# Patient Record
Sex: MALE | Race: WHITE | NOT HISPANIC OR LATINO | Employment: OTHER | URBAN - METROPOLITAN AREA
[De-identification: names, ages, dates, MRNs, and addresses within clinical notes are randomized per-mention and may not be internally consistent; named-entity substitution may affect disease eponyms.]

---

## 2023-08-02 ENCOUNTER — OFFICE VISIT (OUTPATIENT)
Dept: PRIMARY CARE | Facility: CLINIC | Age: 68
End: 2023-08-02
Payer: MEDICARE

## 2023-08-02 VITALS
WEIGHT: 171 LBS | HEIGHT: 70 IN | BODY MASS INDEX: 24.48 KG/M2 | HEART RATE: 61 BPM | TEMPERATURE: 97.5 F | DIASTOLIC BLOOD PRESSURE: 78 MMHG | SYSTOLIC BLOOD PRESSURE: 120 MMHG | OXYGEN SATURATION: 96 %

## 2023-08-02 DIAGNOSIS — I10 ESSENTIAL HYPERTENSION: ICD-10-CM

## 2023-08-02 DIAGNOSIS — H83.03 VIRAL LABYRINTHITIS OF BOTH EARS: Primary | ICD-10-CM

## 2023-08-02 PROBLEM — R94.39 ABNORMAL NUCLEAR STRESS TEST: Status: ACTIVE | Noted: 2023-08-02

## 2023-08-02 PROBLEM — M54.41 ACUTE RIGHT-SIDED LOW BACK PAIN WITH RIGHT-SIDED SCIATICA: Status: ACTIVE | Noted: 2023-08-02

## 2023-08-02 PROBLEM — E78.5 HYPERLIPIDEMIA: Status: ACTIVE | Noted: 2023-08-02

## 2023-08-02 PROBLEM — Z95.1 S/P CABG (CORONARY ARTERY BYPASS GRAFT): Status: ACTIVE | Noted: 2023-08-02

## 2023-08-02 PROBLEM — I51.7 ASYMMETRIC SEPTAL HYPERTROPHY: Status: ACTIVE | Noted: 2023-08-02

## 2023-08-02 PROBLEM — R06.09 DYSPNEA ON EXERTION: Status: ACTIVE | Noted: 2023-08-02

## 2023-08-02 PROBLEM — I65.23 ASYMPTOMATIC BILATERAL CAROTID ARTERY STENOSIS: Status: ACTIVE | Noted: 2023-08-02

## 2023-08-02 PROBLEM — I25.10 CORONARY ARTERY DISEASE: Status: ACTIVE | Noted: 2023-08-02

## 2023-08-02 PROBLEM — I42.2 ASYMMETRIC SEPTAL HYPERTROPHY (MULTI): Status: ACTIVE | Noted: 2023-08-02

## 2023-08-02 PROCEDURE — 99214 OFFICE O/P EST MOD 30 MIN: CPT | Performed by: FAMILY MEDICINE

## 2023-08-02 PROCEDURE — 3074F SYST BP LT 130 MM HG: CPT | Performed by: FAMILY MEDICINE

## 2023-08-02 PROCEDURE — 1126F AMNT PAIN NOTED NONE PRSNT: CPT | Performed by: FAMILY MEDICINE

## 2023-08-02 PROCEDURE — 3078F DIAST BP <80 MM HG: CPT | Performed by: FAMILY MEDICINE

## 2023-08-02 PROCEDURE — 1036F TOBACCO NON-USER: CPT | Performed by: FAMILY MEDICINE

## 2023-08-02 PROCEDURE — 1159F MED LIST DOCD IN RCRD: CPT | Performed by: FAMILY MEDICINE

## 2023-08-02 PROCEDURE — 1160F RVW MEDS BY RX/DR IN RCRD: CPT | Performed by: FAMILY MEDICINE

## 2023-08-02 RX ORDER — METOPROLOL TARTRATE 25 MG/1
1 TABLET, FILM COATED ORAL 2 TIMES DAILY
COMMUNITY
Start: 2022-04-29 | End: 2024-05-07 | Stop reason: SDUPTHER

## 2023-08-02 RX ORDER — NAPROXEN SODIUM 220 MG/1
1 TABLET, FILM COATED ORAL DAILY
COMMUNITY
Start: 2022-05-10

## 2023-08-02 RX ORDER — ATORVASTATIN CALCIUM 80 MG/1
1 TABLET, FILM COATED ORAL DAILY
COMMUNITY
Start: 2022-05-10 | End: 2024-05-07 | Stop reason: SDUPTHER

## 2023-08-02 RX ORDER — LOSARTAN POTASSIUM 50 MG/1
1 TABLET ORAL DAILY
COMMUNITY
Start: 2023-05-23 | End: 2023-10-30 | Stop reason: SDUPTHER

## 2023-08-02 ASSESSMENT — PATIENT HEALTH QUESTIONNAIRE - PHQ9
1. LITTLE INTEREST OR PLEASURE IN DOING THINGS: NOT AT ALL
SUM OF ALL RESPONSES TO PHQ9 QUESTIONS 1 & 2: 0
2. FEELING DOWN, DEPRESSED OR HOPELESS: NOT AT ALL

## 2023-08-02 ASSESSMENT — LIFESTYLE VARIABLES
HOW OFTEN DO YOU HAVE SIX OR MORE DRINKS ON ONE OCCASION: NEVER
HOW OFTEN DO YOU HAVE A DRINK CONTAINING ALCOHOL: NEVER

## 2023-08-02 ASSESSMENT — ENCOUNTER SYMPTOMS
DIZZINESS: 1
FEVER: 1
SPEECH DIFFICULTY: 0
NUMBNESS: 0
PALPITATIONS: 0
VOMITING: 0
SINUS PRESSURE: 0
SINUS PAIN: 0
HEADACHES: 0
PSYCHIATRIC NEGATIVE: 1
NAUSEA: 0
FATIGUE: 1
COUGH: 0

## 2023-08-02 ASSESSMENT — PAIN SCALES - GENERAL: PAINLEVEL: 0-NO PAIN

## 2023-08-02 NOTE — PROGRESS NOTES
Subjective   Patient ID: Aman Plaza is a 67 y.o. male who presents for Sinusitis (Patient is here for sinus congestion x 1 week).  HPI  67-year-old male with a history of hypertension presents for 4 to 5 days of intermittent vertigo.  Slight fatigue.  Home blood pressures have been stable.  His dose of losartan was recently cut in half by cardiology.  He denies any visual changes.  No headache.  No nausea or vomiting.  No fever or chills.  Review of Systems   Constitutional:  Positive for fatigue and fever.   HENT:  Negative for hearing loss, sinus pressure and sinus pain.    Eyes:  Negative for visual disturbance.   Respiratory:  Negative for cough.    Cardiovascular:  Negative for chest pain and palpitations.   Gastrointestinal:  Negative for nausea and vomiting.   Neurological:  Positive for dizziness. Negative for syncope, speech difficulty, numbness and headaches.   Psychiatric/Behavioral: Negative.     All other systems reviewed and are negative.      Visit Vitals  /78 (BP Location: Left arm, Patient Position: Sitting, BP Cuff Size: Adult)   Pulse 61   Temp 36.4 °C (97.5 °F) (Temporal)        Objective   Physical Exam  Vitals reviewed.   Constitutional:       Appearance: Normal appearance.   HENT:      Right Ear: Tympanic membrane and ear canal normal.      Left Ear: Tympanic membrane and ear canal normal.      Nose: Nose normal. No congestion.      Mouth/Throat:      Pharynx: No oropharyngeal exudate or posterior oropharyngeal erythema.   Eyes:      Extraocular Movements: Extraocular movements intact.      Pupils: Pupils are equal, round, and reactive to light.   Neck:      Vascular: No carotid bruit.   Cardiovascular:      Rate and Rhythm: Normal rate and regular rhythm.      Heart sounds: Normal heart sounds.   Pulmonary:      Breath sounds: Normal breath sounds.   Neurological:      General: No focal deficit present.      Mental Status: He is alert and oriented to person, place, and time.    Psychiatric:         Mood and Affect: Mood normal.         Behavior: Behavior normal.         Assessment/Plan   Problem List Items Addressed This Visit       Essential hypertension    Viral labyrinthitis of both ears - Primary     #1.  Over-the-counter meclizine as needed for viral labyrinthitis.  Epley maneuvers described and information provided to patient.  He should call immediately with any other symptoms.  2.  Blood pressure stable.

## 2023-08-03 ENCOUNTER — APPOINTMENT (OUTPATIENT)
Dept: PRIMARY CARE | Facility: CLINIC | Age: 68
End: 2023-08-03
Payer: MEDICARE

## 2023-10-30 DIAGNOSIS — I10 ESSENTIAL HYPERTENSION: Primary | ICD-10-CM

## 2023-10-31 DIAGNOSIS — I10 ESSENTIAL HYPERTENSION: ICD-10-CM

## 2023-10-31 RX ORDER — LOSARTAN POTASSIUM 25 MG/1
25 TABLET ORAL DAILY
Qty: 90 TABLET | Refills: 3 | Status: SHIPPED | OUTPATIENT
Start: 2023-10-31 | End: 2023-10-31 | Stop reason: SDUPTHER

## 2023-11-02 RX ORDER — LOSARTAN POTASSIUM 25 MG/1
25 TABLET ORAL DAILY
Qty: 90 TABLET | Refills: 3 | Status: SHIPPED | OUTPATIENT
Start: 2023-11-02 | End: 2024-05-23 | Stop reason: SDUPTHER

## 2024-02-21 ENCOUNTER — NURSE TRIAGE (OUTPATIENT)
Dept: PRIMARY CARE | Facility: CLINIC | Age: 69
End: 2024-02-21
Payer: MEDICARE

## 2024-05-07 DIAGNOSIS — E78.5 HYPERLIPIDEMIA, UNSPECIFIED HYPERLIPIDEMIA TYPE: Primary | ICD-10-CM

## 2024-05-07 RX ORDER — METOPROLOL TARTRATE 25 MG/1
25 TABLET, FILM COATED ORAL 2 TIMES DAILY
Qty: 90 TABLET | Refills: 3 | Status: SHIPPED | OUTPATIENT
Start: 2024-05-07 | End: 2024-05-15 | Stop reason: SDUPTHER

## 2024-05-07 RX ORDER — ATORVASTATIN CALCIUM 80 MG/1
80 TABLET, FILM COATED ORAL DAILY
Qty: 90 TABLET | Refills: 3 | Status: SHIPPED | OUTPATIENT
Start: 2024-05-07 | End: 2024-05-16 | Stop reason: SDUPTHER

## 2024-05-15 DIAGNOSIS — E78.5 HYPERLIPIDEMIA, UNSPECIFIED HYPERLIPIDEMIA TYPE: Primary | ICD-10-CM

## 2024-05-17 RX ORDER — METOPROLOL TARTRATE 25 MG/1
25 TABLET, FILM COATED ORAL 2 TIMES DAILY
Qty: 90 TABLET | Refills: 3 | Status: SHIPPED | OUTPATIENT
Start: 2024-05-17 | End: 2024-05-23 | Stop reason: SDUPTHER

## 2024-05-17 RX ORDER — ATORVASTATIN CALCIUM 80 MG/1
80 TABLET, FILM COATED ORAL DAILY
Qty: 90 TABLET | Refills: 3 | Status: SHIPPED | OUTPATIENT
Start: 2024-05-17 | End: 2024-05-23 | Stop reason: SDUPTHER

## 2024-05-23 ENCOUNTER — OFFICE VISIT (OUTPATIENT)
Dept: CARDIOLOGY | Facility: CLINIC | Age: 69
End: 2024-05-23
Payer: MEDICARE

## 2024-05-23 VITALS
BODY MASS INDEX: 24.6 KG/M2 | WEIGHT: 171.8 LBS | DIASTOLIC BLOOD PRESSURE: 80 MMHG | OXYGEN SATURATION: 94 % | HEIGHT: 70 IN | SYSTOLIC BLOOD PRESSURE: 130 MMHG | HEART RATE: 63 BPM

## 2024-05-23 DIAGNOSIS — I42.2 ASYMMETRIC SEPTAL HYPERTROPHY (MULTI): ICD-10-CM

## 2024-05-23 DIAGNOSIS — I65.23 ASYMPTOMATIC BILATERAL CAROTID ARTERY STENOSIS: ICD-10-CM

## 2024-05-23 DIAGNOSIS — R06.09 DYSPNEA ON EXERTION: ICD-10-CM

## 2024-05-23 DIAGNOSIS — I10 ESSENTIAL HYPERTENSION: Primary | ICD-10-CM

## 2024-05-23 DIAGNOSIS — Z95.1 S/P CABG (CORONARY ARTERY BYPASS GRAFT): ICD-10-CM

## 2024-05-23 DIAGNOSIS — I25.10 CORONARY ARTERY DISEASE INVOLVING NATIVE CORONARY ARTERY OF NATIVE HEART WITHOUT ANGINA PECTORIS: ICD-10-CM

## 2024-05-23 DIAGNOSIS — E78.5 HYPERLIPIDEMIA, UNSPECIFIED HYPERLIPIDEMIA TYPE: ICD-10-CM

## 2024-05-23 PROBLEM — R94.39 ABNORMAL NUCLEAR STRESS TEST: Status: RESOLVED | Noted: 2023-08-02 | Resolved: 2024-05-23

## 2024-05-23 PROCEDURE — 99213 OFFICE O/P EST LOW 20 MIN: CPT | Performed by: INTERNAL MEDICINE

## 2024-05-23 PROCEDURE — 1036F TOBACCO NON-USER: CPT | Performed by: INTERNAL MEDICINE

## 2024-05-23 PROCEDURE — 1159F MED LIST DOCD IN RCRD: CPT | Performed by: INTERNAL MEDICINE

## 2024-05-23 PROCEDURE — 3075F SYST BP GE 130 - 139MM HG: CPT | Performed by: INTERNAL MEDICINE

## 2024-05-23 PROCEDURE — 93000 ELECTROCARDIOGRAM COMPLETE: CPT | Performed by: INTERNAL MEDICINE

## 2024-05-23 PROCEDURE — 3079F DIAST BP 80-89 MM HG: CPT | Performed by: INTERNAL MEDICINE

## 2024-05-23 RX ORDER — LOSARTAN POTASSIUM 25 MG/1
25 TABLET ORAL DAILY
Qty: 90 TABLET | Refills: 3 | Status: SHIPPED | OUTPATIENT
Start: 2024-05-23

## 2024-05-23 RX ORDER — ATORVASTATIN CALCIUM 80 MG/1
80 TABLET, FILM COATED ORAL DAILY
Qty: 90 TABLET | Refills: 3 | Status: SHIPPED | OUTPATIENT
Start: 2024-05-23

## 2024-05-23 RX ORDER — METOPROLOL TARTRATE 25 MG/1
25 TABLET, FILM COATED ORAL 2 TIMES DAILY
Qty: 180 TABLET | Refills: 3 | Status: SHIPPED | OUTPATIENT
Start: 2024-05-23 | End: 2025-05-23

## 2024-05-23 ASSESSMENT — ENCOUNTER SYMPTOMS: DYSPNEA ON EXERTION: 1

## 2024-05-23 NOTE — PROGRESS NOTES
Subjective   Aman Plaza is a 68 y.o. male.    Chief Complaint:  Follow-up coronary disease, coronary artery bypass grafting.    HPI    He is now 2 years post coronary bypass grafting.  His incisional discomfort has resolved.  He has had no anginal symptoms.  No pressure or heaviness in the chest with activities.  Has had no syncopal or near syncopal events.  Mild exertional dyspnea otherwise has done well.  No other medical problems or medical illnesses over the past year.    The patient presented in 2022 with typical anginal symptoms. A stress thallium study showed 4 mm ST segment depression with exercise. The patient underwent urgent cardiac catheterization. This demonstrated total occlusion of the right coronary artery with an 80% left main stenosis. He underwent urgent coronary artery bypass grafting.      An echocardiographic study in May 2022 demonstrated asymmetric septal hypertrophy with outflow obstruction with a Valsalva maneuver.     Coronary artery bypass grafting was done on May 6, 2022. At that time he had a left internal mammary graft to the anterior descending, radial artery graft to the obtuse marginal 1 and 2. He had left atrial appendage occlusion.     His past cardiac history significant for hypertension. Has no other past cardiac history. Risk factors for coronary artery disease i are lacking. However her father  fairly young of cancer.     Past medical history: Significant for appendectomy as a teenager. He has had no hospitalizations or major surgical procedures.     Social history: He is a retired  in the [employer]. He has 4 children.     Family history: Mother had valvular heart issues and father  of cancer.     Review of Systems   Cardiovascular:  Positive for dyspnea on exertion.   All other systems reviewed and are negative.      Current Outpatient Medications   Medication Sig Dispense Refill    aspirin 81 mg chewable tablet Chew 1 tablet (81  "mg) once daily.      atorvastatin (Lipitor) 80 mg tablet Take 1 tablet (80 mg) by mouth once daily. 90 tablet 3    losartan (Cozaar) 25 mg tablet Take 1 tablet (25 mg) by mouth once daily. 90 tablet 3    metoprolol tartrate (Lopressor) 25 mg tablet Take 1 tablet (25 mg) by mouth 2 times a day. 180 tablet 3     No current facility-administered medications for this visit.        Visit Vitals  /80   Pulse 63   Ht 1.765 m (5' 9.5\")   Wt 77.9 kg (171 lb 12.8 oz)   SpO2 94%   BMI 25.01 kg/m²   Smoking Status Never   BSA 1.95 m²        Objective     Constitutional:       Appearance: Not in distress.   Neck:      Vascular: JVD normal.   Pulmonary:      Breath sounds: Normal breath sounds.   Cardiovascular:      Normal rate. Regular rhythm. S1 with normal intensity. S2 with normal intensity.       Murmurs: There is no murmur.      No gallop.    Pulses:     Intact distal pulses.   Edema:     Peripheral edema absent.   Abdominal:      General: Bowel sounds are normal.   Neurological:      Mental Status: Alert and oriented to person, place and time.         Lab Review:   Lab Results   Component Value Date     09/02/2022    K 4.4 09/02/2022     09/02/2022    CO2 28 09/02/2022    BUN 19 09/02/2022    CREATININE 0.98 09/02/2022    GLUCOSE 96 09/02/2022    CALCIUM 9.6 09/02/2022     Lab Results   Component Value Date    CHOL 121 09/02/2022    TRIG 76 09/02/2022    HDL 39.2 (A) 09/02/2022       Assessment:    1.  Coronary disease.  Status post coronary artery bypass grafting.  Has 3 arterial grafts.  No anginal symptoms.  Continue medical management.    2.  Asymmetric septal hypertrophy.  Has a soft systolic murmur.  Will repeat echo next year.  He is on low-dose beta-blocker therapy.    3.  Hypertension.  Initial blood    Today's EKG shows sinus bradycardia with heart rate of 57.  Pressure is elevated, repeat blood pressure is normal.  "

## 2025-04-29 DIAGNOSIS — E78.5 HYPERLIPIDEMIA, UNSPECIFIED HYPERLIPIDEMIA TYPE: ICD-10-CM

## 2025-04-29 RX ORDER — ATORVASTATIN CALCIUM 80 MG/1
80 TABLET, FILM COATED ORAL DAILY
Qty: 90 TABLET | Refills: 3 | Status: SHIPPED | OUTPATIENT
Start: 2025-04-29

## 2025-05-22 ENCOUNTER — HOSPITAL ENCOUNTER (OUTPATIENT)
Dept: CARDIOLOGY | Facility: CLINIC | Age: 70
Discharge: HOME | End: 2025-05-22
Payer: MEDICARE

## 2025-05-22 ENCOUNTER — APPOINTMENT (OUTPATIENT)
Dept: CARDIOLOGY | Facility: CLINIC | Age: 70
End: 2025-05-22
Payer: MEDICARE

## 2025-05-22 VITALS
BODY MASS INDEX: 24.34 KG/M2 | DIASTOLIC BLOOD PRESSURE: 76 MMHG | HEIGHT: 70 IN | HEART RATE: 58 BPM | WEIGHT: 170 LBS | OXYGEN SATURATION: 97 % | SYSTOLIC BLOOD PRESSURE: 128 MMHG

## 2025-05-22 DIAGNOSIS — E78.5 HYPERLIPIDEMIA, UNSPECIFIED HYPERLIPIDEMIA TYPE: ICD-10-CM

## 2025-05-22 DIAGNOSIS — I10 ESSENTIAL HYPERTENSION: Primary | ICD-10-CM

## 2025-05-22 DIAGNOSIS — I25.10 CORONARY ARTERY DISEASE INVOLVING NATIVE CORONARY ARTERY OF NATIVE HEART WITHOUT ANGINA PECTORIS: ICD-10-CM

## 2025-05-22 DIAGNOSIS — I51.7 ASYMMETRIC SEPTAL HYPERTROPHY: ICD-10-CM

## 2025-05-22 DIAGNOSIS — R06.09 DYSPNEA ON EXERTION: ICD-10-CM

## 2025-05-22 DIAGNOSIS — Z12.5 ENCOUNTER FOR SCREENING PROSTATE SPECIFIC ANTIGEN (PSA) MEASUREMENT: ICD-10-CM

## 2025-05-22 DIAGNOSIS — Z95.1 S/P CABG (CORONARY ARTERY BYPASS GRAFT): ICD-10-CM

## 2025-05-22 DIAGNOSIS — I65.23 ASYMPTOMATIC BILATERAL CAROTID ARTERY STENOSIS: ICD-10-CM

## 2025-05-22 LAB
AORTIC VALVE MEAN GRADIENT: 5 MMHG
AORTIC VALVE PEAK VELOCITY: 1.59 M/S
ATRIAL RATE: 51 BPM
AV PEAK GRADIENT: 10 MMHG
AVA (PEAK VEL): 2.07 CM2
AVA (VTI): 2.08 CM2
EJECTION FRACTION APICAL 4 CHAMBER: 64.9
EJECTION FRACTION: 68 %
LEFT ATRIUM VOLUME AREA LENGTH INDEX BSA: 37.3 ML/M2
LEFT VENTRICLE INTERNAL DIMENSION DIASTOLE: 4.51 CM (ref 3.5–6)
LEFT VENTRICULAR OUTFLOW TRACT DIAMETER: 2.1 CM
P AXIS: 4 DEGREES
P OFFSET: 195 MS
P ONSET: 152 MS
PR INTERVAL: 148 MS
Q ONSET: 226 MS
QRS COUNT: 8 BEATS
QRS DURATION: 88 MS
QT INTERVAL: 444 MS
QTC CALCULATION(BAZETT): 409 MS
QTC FREDERICIA: 421 MS
R AXIS: 17 DEGREES
RIGHT VENTRICLE FREE WALL PEAK S': 0.08 CM/S
RIGHT VENTRICLE PEAK SYSTOLIC PRESSURE: 42.4 MMHG
T AXIS: 64 DEGREES
T OFFSET: 448 MS
TRICUSPID ANNULAR PLANE SYSTOLIC EXCURSION: 2 CM
VENTRICULAR RATE: 51 BPM

## 2025-05-22 PROCEDURE — 93005 ELECTROCARDIOGRAM TRACING: CPT | Performed by: INTERNAL MEDICINE

## 2025-05-22 PROCEDURE — 1036F TOBACCO NON-USER: CPT | Performed by: INTERNAL MEDICINE

## 2025-05-22 PROCEDURE — 99202 OFFICE O/P NEW SF 15 MIN: CPT | Mod: 25 | Performed by: INTERNAL MEDICINE

## 2025-05-22 PROCEDURE — 3078F DIAST BP <80 MM HG: CPT | Performed by: INTERNAL MEDICINE

## 2025-05-22 PROCEDURE — 93306 TTE W/DOPPLER COMPLETE: CPT | Performed by: INTERNAL MEDICINE

## 2025-05-22 PROCEDURE — 1159F MED LIST DOCD IN RCRD: CPT | Performed by: INTERNAL MEDICINE

## 2025-05-22 PROCEDURE — 3074F SYST BP LT 130 MM HG: CPT | Performed by: INTERNAL MEDICINE

## 2025-05-22 PROCEDURE — 93306 TTE W/DOPPLER COMPLETE: CPT

## 2025-05-22 PROCEDURE — 99213 OFFICE O/P EST LOW 20 MIN: CPT | Performed by: INTERNAL MEDICINE

## 2025-05-22 PROCEDURE — 3008F BODY MASS INDEX DOCD: CPT | Performed by: INTERNAL MEDICINE

## 2025-05-22 RX ORDER — ATORVASTATIN CALCIUM 80 MG/1
80 TABLET, FILM COATED ORAL DAILY
Qty: 90 TABLET | Refills: 3 | Status: SHIPPED | OUTPATIENT
Start: 2025-05-22

## 2025-05-22 RX ORDER — LOSARTAN POTASSIUM 25 MG/1
25 TABLET ORAL DAILY
Qty: 90 TABLET | Refills: 3 | Status: SHIPPED | OUTPATIENT
Start: 2025-05-22

## 2025-05-22 RX ORDER — METOPROLOL TARTRATE 25 MG/1
25 TABLET, FILM COATED ORAL 2 TIMES DAILY
Qty: 180 TABLET | Refills: 3 | Status: SHIPPED | OUTPATIENT
Start: 2025-05-22 | End: 2026-05-22

## 2025-05-22 NOTE — PROGRESS NOTES
"Subjective   Aman Plaza is a 69 y.o. male.    Chief Complaint:  No chief complaint on file.    HPI    The patient presented in 2022 with typical anginal symptoms. A stress thallium study showed 4 mm ST segment depression with exercise. The patient underwent urgent cardiac catheterization. This demonstrated total occlusion of the right coronary artery with an 80% left main stenosis. He underwent urgent coronary artery bypass grafting.      An echocardiographic study in May 2022 demonstrated asymmetric septal hypertrophy with outflow obstruction with a Valsalva maneuver.     Coronary artery bypass grafting was done on May 6, 2022. At that time he had a left internal mammary graft to the anterior descending, radial artery graft to the obtuse marginal 1 and 2. He had left atrial appendage occlusion.     His past cardiac history significant for hypertension. Has no other past cardiac history. Risk factors for coronary artery disease i are lacking. However her father  fairly young of cancer.     Past medical history: Significant for appendectomy as a teenager. He has had no hospitalizations or major surgical procedures.     Social history: He is a retired  in the [employer]. He has 4 children.     Family history: Mother had valvular heart issues and father  of cancer.      Review of Systems   Cardiovascular:  Positive for dyspnea on exertion.   All other systems reviewed and are negative.    ROS    Current Medications[1]     Visit Vitals  Smoking Status Never        Objective     Physical Exam    Lab Review:   {Recent labs:13060::\"not applicable\"}    Assessment:    1.       [1]   Current Outpatient Medications   Medication Sig Dispense Refill    aspirin 81 mg chewable tablet Chew 1 tablet (81 mg) once daily.      atorvastatin (Lipitor) 80 mg tablet TAKE 1 TABLET ONCE DAILY 90 tablet 3    losartan (Cozaar) 25 mg tablet Take 1 tablet (25 mg) by mouth once daily. 90 tablet 3    " metoprolol tartrate (Lopressor) 25 mg tablet Take 1 tablet (25 mg) by mouth 2 times a day. 180 tablet 3     No current facility-administered medications for this visit.      visit.

## 2025-05-22 NOTE — PATIENT INSTRUCTIONS
We need you to get some blood test.    Your echocardiogram shows normal heart function.    You have some asymmetric heart muscle but this is not causing any problems.

## 2025-06-12 LAB
ALBUMIN SERPL-MCNC: 4.4 G/DL (ref 3.6–5.1)
ALP SERPL-CCNC: 67 U/L (ref 35–144)
ALT SERPL-CCNC: 17 U/L (ref 9–46)
ANION GAP SERPL CALCULATED.4IONS-SCNC: 6 MMOL/L (CALC) (ref 7–17)
AST SERPL-CCNC: 14 U/L (ref 10–35)
BILIRUB SERPL-MCNC: 1 MG/DL (ref 0.2–1.2)
BUN SERPL-MCNC: 22 MG/DL (ref 7–25)
CALCIUM SERPL-MCNC: 9.5 MG/DL (ref 8.6–10.3)
CHLORIDE SERPL-SCNC: 106 MMOL/L (ref 98–110)
CHOLEST SERPL-MCNC: 127 MG/DL
CHOLEST/HDLC SERPL: 3 (CALC)
CO2 SERPL-SCNC: 27 MMOL/L (ref 20–32)
CREAT SERPL-MCNC: 0.91 MG/DL (ref 0.7–1.28)
EGFRCR SERPLBLD CKD-EPI 2021: 91 ML/MIN/1.73M2
ERYTHROCYTE [DISTWIDTH] IN BLOOD BY AUTOMATED COUNT: 12 % (ref 11–15)
GLUCOSE SERPL-MCNC: 94 MG/DL (ref 65–99)
HCT VFR BLD AUTO: 47.3 % (ref 38.5–50)
HDLC SERPL-MCNC: 42 MG/DL
HGB BLD-MCNC: 15.4 G/DL (ref 13.2–17.1)
LDLC SERPL CALC-MCNC: 70 MG/DL (CALC)
MCH RBC QN AUTO: 30.4 PG (ref 27–33)
MCHC RBC AUTO-ENTMCNC: 32.6 G/DL (ref 32–36)
MCV RBC AUTO: 93.3 FL (ref 80–100)
NONHDLC SERPL-MCNC: 85 MG/DL (CALC)
PLATELET # BLD AUTO: 201 THOUSAND/UL (ref 140–400)
PMV BLD REES-ECKER: 10.5 FL (ref 7.5–12.5)
POTASSIUM SERPL-SCNC: 4.3 MMOL/L (ref 3.5–5.3)
PROT SERPL-MCNC: 6.6 G/DL (ref 6.1–8.1)
PSA SERPL-MCNC: 1.02 NG/ML
RBC # BLD AUTO: 5.07 MILLION/UL (ref 4.2–5.8)
SODIUM SERPL-SCNC: 139 MMOL/L (ref 135–146)
TRIGL SERPL-MCNC: 74 MG/DL
WBC # BLD AUTO: 6.9 THOUSAND/UL (ref 3.8–10.8)

## 2025-06-16 ENCOUNTER — TELEPHONE (OUTPATIENT)
Dept: CARDIOLOGY | Facility: CLINIC | Age: 70
End: 2025-06-16
Payer: MEDICARE

## 2025-06-16 NOTE — TELEPHONE ENCOUNTER
----- Message from Tre Herrera sent at 6/15/2025  9:30 PM EDT -----  A blood tests are normal.  ----- Message -----  From: [facility] Results In  Sent: 6/12/2025  10:44 PM EDT  To: Tre Herrera MD

## 2025-07-06 LAB
ATRIAL RATE: 51 BPM
P AXIS: 4 DEGREES
P OFFSET: 195 MS
P ONSET: 152 MS
PR INTERVAL: 148 MS
Q ONSET: 226 MS
QRS COUNT: 8 BEATS
QRS DURATION: 88 MS
QT INTERVAL: 444 MS
QTC CALCULATION(BAZETT): 409 MS
QTC FREDERICIA: 421 MS
R AXIS: 17 DEGREES
T AXIS: 64 DEGREES
T OFFSET: 448 MS
VENTRICULAR RATE: 51 BPM